# Patient Record
Sex: MALE | Race: OTHER | NOT HISPANIC OR LATINO | URBAN - METROPOLITAN AREA
[De-identification: names, ages, dates, MRNs, and addresses within clinical notes are randomized per-mention and may not be internally consistent; named-entity substitution may affect disease eponyms.]

---

## 2020-07-03 ENCOUNTER — EMERGENCY (EMERGENCY)
Facility: HOSPITAL | Age: 45
LOS: 1 days | Discharge: HOME | End: 2020-07-03
Attending: EMERGENCY MEDICINE | Admitting: EMERGENCY MEDICINE
Payer: SELF-PAY

## 2020-07-03 VITALS
RESPIRATION RATE: 16 BRPM | HEART RATE: 86 BPM | OXYGEN SATURATION: 98 % | SYSTOLIC BLOOD PRESSURE: 124 MMHG | HEIGHT: 69 IN | TEMPERATURE: 97 F | DIASTOLIC BLOOD PRESSURE: 79 MMHG | WEIGHT: 175.05 LBS

## 2020-07-03 PROCEDURE — 99282 EMERGENCY DEPT VISIT SF MDM: CPT

## 2020-07-03 NOTE — ED PROVIDER NOTE - NSFOLLOWUPINSTRUCTIONS_ED_ALL_ED_FT
Follow up with your primary care physician in one week.         Wound Closure Removal, Care After  This sheet gives you information about how to care for yourself after your stitches (sutures), staples, or skin adhesives have been removed. Your health care provider may also give you more specific instructions. If you have problems or questions, contact your health care provider.  What can I expect after the procedure?  After your sutures or staples have been removed or your skin adhesives have fallen off, it is common to have:  Some discomfort and swelling in the area.Slight redness in the area.Follow these instructions at home:  If you have a bandage:     Wash your hands with soap and water before you change your bandage (dressing). If soap and water are not available, use hand .Change your dressing as told by your health care provider. If your dressing becomes wet or dirty, or develops a bad smell, change it as soon as possible. If your dressing sticks to your skin, pour warm, clean water over it until it loosens and can be removed without pulling apart the wound edges. Pat the area dry with a soft, clean towel. Do not rub the wound because that may cause bleeding.Wound care        Keep the wound area dry and clean.Check your wound every day for signs of infection. Check for:  Redness, swelling, or pain.Fluid or blood. Warmth. Pus or a bad smell.Wash your hands with soap and water before and after touching your wound.Apply cream or ointment only as told by your health care provider. If you are using cream or ointment, wash the area with soap and water 2 times a day to remove all the cream or ointment. Rinse off the soap and pat the area dry with a clean towel.If skin glue or adhesive strips were applied after sutures or staples were removed, leave these closures in place until they peel off on their own. If adhesive strip edges start to loosen and curl up, you may trim the loose edges. Do not remove adhesive strips completely unless your health care provider tells you to do that.Continue to protect the wound from injury. Do not pick at your wound. Picking can cause an infection.Bathing     Do not take baths, swim, or use a hot tub until your health care provider approves.Ask your health care provider when it is okay to shower.Follow these steps for showering:  If you have a dressing, remove it before getting into the shower.In the shower, allow soapy water to get on the wound. Avoid scrubbing the wound.When you get out of the shower, dry the wound by patting it with a clean towel.Reapply a dressing over the wound if needed.Scar care     When your wound has completely healed, take actions to help decrease the size of your scar:  Wear sunscreen over the scar or cover it with clothing when you are outside. New scars get sunburned easily, which can make scarring worse.Gently massage the scarred area. This can decrease scar thickness.General instructions     Take over-the-counter and prescription medicines only as told by your health care provider. Keep all follow-up visits as told by your health care provider. This is important.Contact a health care provider if:  You have redness, swelling, or pain around your wound. You have fluid or blood coming from your wound. Your wound feels warm to the touch. You have pus or a bad smell coming from your wound. Your wound opens up.You have chills.Get help right away if:  You have a fever.You have redness that is spreading from your wound.Summary  Change your dressing as told by your health care provider. If your dressing becomes wet or dirty, or develops a bad smell, change it as soon as possible.Check your wound every day for signs of infection.Wash your hands with soap and water before and after touching your wound.This information is not intended to replace advice given to you by your health care provider. Make sure you discuss any questions you have with your health care provider.    Document Released: 11/30/2009 Document Revised: 11/30/2018 Document Reviewed: 10/08/2018  [employer]vier Patient Education © 2020 [employer]

## 2020-07-03 NOTE — ED PROVIDER NOTE - CLINICAL SUMMARY MEDICAL DECISION MAKING FREE TEXT BOX
staples to right calf. no evidence of infection on exam. wound healing well. sutures removed without complication. dressing and bacitracin applied. f/u with pmd. return precautions d/w pt.

## 2020-07-03 NOTE — ED ADULT NURSE NOTE - OBJECTIVE STATEMENT
Pt quotes "to get my staples removed." Noted 13 staples intact to left calf. Denies fever or chills.

## 2020-07-03 NOTE — ED PROVIDER NOTE - SKIN, MLM
Skin normal color for race, warm, dry and intact. No evidence of rash. Multiple staples to posterior right calf, wound nontender, no erythema, mild bruising distally to wound, appears to be healing, distally neurovascular intact, staples removed without issues.

## 2020-07-03 NOTE — ED PROVIDER NOTE - OBJECTIVE STATEMENT
44y M healthy presents to the ED for staple removal to right calf. Patient states staples were placed here 14x days ago. Denies pain, discharge, fever, chills, or swelling to area. Patient has been applying Bacitracin and doing wound care as directed.

## 2020-07-03 NOTE — ED PROVIDER NOTE - CONSTITUTIONAL, MLM
Well appearing, awake, alert, oriented to person, place, time/situation and in no apparent distress. normal... Well appearing, awake, alert, oriented  and in no apparent distress.

## 2020-07-03 NOTE — ED PROVIDER NOTE - PATIENT PORTAL LINK FT
You can access the FollowMyHealth Patient Portal offered by [facility] by registering at the following website: [email or web address]. By joining [facility]’s FollowMyHealth portal, you will also be able to view your health information using other applications (apps) compatible with our system.

## 2020-07-07 DIAGNOSIS — S81.812D LACERATION WITHOUT FOREIGN BODY, LEFT LOWER LEG, SUBSEQUENT ENCOUNTER: ICD-10-CM
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
